# Patient Record
Sex: MALE | Race: WHITE | NOT HISPANIC OR LATINO | Employment: UNEMPLOYED | URBAN - METROPOLITAN AREA
[De-identification: names, ages, dates, MRNs, and addresses within clinical notes are randomized per-mention and may not be internally consistent; named-entity substitution may affect disease eponyms.]

---

## 2024-11-13 ENCOUNTER — HOSPITAL ENCOUNTER (OUTPATIENT)
Dept: CARDIOLOGY | Facility: HOSPITAL | Age: 59
Discharge: HOME | End: 2024-11-13
Admitting: INTERNAL MEDICINE
Payer: COMMERCIAL

## 2024-11-13 VITALS — WEIGHT: 231.48 LBS | BODY MASS INDEX: 31.35 KG/M2 | HEIGHT: 72 IN

## 2024-11-13 DIAGNOSIS — I34.0 NONRHEUMATIC MITRAL VALVE REGURGITATION: ICD-10-CM

## 2024-11-13 PROCEDURE — 93306 TTE W/DOPPLER COMPLETE: CPT

## 2024-11-14 LAB
ASCENDING AORTA: 3.9 CM
BH CV ECHO MEAS - AO MAX PG: 7.2 MMHG
BH CV ECHO MEAS - AO MEAN PG: 4 MMHG
BH CV ECHO MEAS - AO ROOT DIAM: 3.9 CM
BH CV ECHO MEAS - AO V2 MAX: 134 CM/SEC
BH CV ECHO MEAS - AO V2 VTI: 33.1 CM
BH CV ECHO MEAS - AVA(I,D): 3.5 CM2
BH CV ECHO MEAS - EDV(CUBED): 262.1 ML
BH CV ECHO MEAS - EDV(MOD-SP2): 113 ML
BH CV ECHO MEAS - EDV(MOD-SP4): 132 ML
BH CV ECHO MEAS - EF(MOD-BP): 57.1 %
BH CV ECHO MEAS - EF(MOD-SP2): 55.8 %
BH CV ECHO MEAS - EF(MOD-SP4): 58.3 %
BH CV ECHO MEAS - ESV(CUBED): 37.9 ML
BH CV ECHO MEAS - ESV(MOD-SP2): 49.9 ML
BH CV ECHO MEAS - ESV(MOD-SP4): 55.1 ML
BH CV ECHO MEAS - FS: 47.5 %
BH CV ECHO MEAS - IVS/LVPW: 1 CM
BH CV ECHO MEAS - IVSD: 1.1 CM
BH CV ECHO MEAS - LA DIMENSION: 4.8 CM
BH CV ECHO MEAS - LAT PEAK E' VEL: 6.8 CM/SEC
BH CV ECHO MEAS - LV DIASTOLIC VOL/BSA (35-75): 58.3 CM2
BH CV ECHO MEAS - LV MASS(C)D: 311.7 GRAMS
BH CV ECHO MEAS - LV MAX PG: 7 MMHG
BH CV ECHO MEAS - LV MEAN PG: 4 MMHG
BH CV ECHO MEAS - LV SYSTOLIC VOL/BSA (12-30): 24.3 CM2
BH CV ECHO MEAS - LV V1 MAX: 131 CM/SEC
BH CV ECHO MEAS - LV V1 VTI: 30.4 CM
BH CV ECHO MEAS - LVIDD: 6.4 CM
BH CV ECHO MEAS - LVIDS: 3.4 CM
BH CV ECHO MEAS - LVOT AREA: 3.8 CM2
BH CV ECHO MEAS - LVOT DIAM: 2.21 CM
BH CV ECHO MEAS - LVPWD: 1.1 CM
BH CV ECHO MEAS - MED PEAK E' VEL: 6.8 CM/SEC
BH CV ECHO MEAS - MV A MAX VEL: 67.9 CM/SEC
BH CV ECHO MEAS - MV DEC SLOPE: 243.6 CM/SEC2
BH CV ECHO MEAS - MV DEC TIME: 0.27 SEC
BH CV ECHO MEAS - MV E MAX VEL: 61.1 CM/SEC
BH CV ECHO MEAS - MV E/A: 0.9
BH CV ECHO MEAS - MV MAX PG: 3.3 MMHG
BH CV ECHO MEAS - MV MEAN PG: 0.96 MMHG
BH CV ECHO MEAS - MV P1/2T: 84.6 MSEC
BH CV ECHO MEAS - MV V2 VTI: 31.2 CM
BH CV ECHO MEAS - MVA(P1/2T): 2.6 CM2
BH CV ECHO MEAS - MVA(VTI): 3.7 CM2
BH CV ECHO MEAS - PA ACC TIME: 0.17 SEC
BH CV ECHO MEAS - RAP SYSTOLE: 3 MMHG
BH CV ECHO MEAS - RVSP: 18 MMHG
BH CV ECHO MEAS - SV(LVOT): 116.6 ML
BH CV ECHO MEAS - SV(MOD-SP2): 63.1 ML
BH CV ECHO MEAS - SV(MOD-SP4): 76.9 ML
BH CV ECHO MEAS - SVI(LVOT): 51.5 ML/M2
BH CV ECHO MEAS - SVI(MOD-SP2): 27.9 ML/M2
BH CV ECHO MEAS - SVI(MOD-SP4): 34 ML/M2
BH CV ECHO MEAS - TAPSE (>1.6): 2.19 CM
BH CV ECHO MEAS - TR MAX PG: 15.4 MMHG
BH CV ECHO MEAS - TR MAX VEL: 196.1 CM/SEC
BH CV ECHO MEASUREMENTS AVERAGE E/E' RATIO: 8.99
BH CV VAS BP RIGHT ARM: NORMAL MMHG
BH CV XLRA - RV BASE: 3.6 CM
BH CV XLRA - RV LENGTH: 8.2 CM
BH CV XLRA - RV MID: 2.19 CM
BH CV XLRA - TDI S': 14 CM/SEC
LEFT ATRIUM VOLUME INDEX: 30.3 ML/M2

## 2024-11-15 ENCOUNTER — TELEPHONE (OUTPATIENT)
Dept: CARDIOLOGY | Facility: CLINIC | Age: 59
End: 2024-11-15
Payer: COMMERCIAL

## 2024-11-15 NOTE — TELEPHONE ENCOUNTER
Discussed with pt recent Echo results, unchanged from prior.    Pt called and informed of the above results, verbalized understanding.

## 2025-01-15 ENCOUNTER — READMISSION MANAGEMENT (OUTPATIENT)
Dept: CALL CENTER | Facility: HOSPITAL | Age: 60
End: 2025-01-15
Payer: COMMERCIAL

## 2025-01-15 NOTE — OUTREACH NOTE
Prep Survey      Flowsheet Row Responses   [facility] facility patient discharged from? Non-BH   Is LACE score < 7 ? Non-BH Discharge   Eligibility [facility]   Date of Admission 01/02/25   Date of Discharge 01/15/25   Discharge diagnosis Dissection of ascending aorta (CMS/HCC) (Primary Dx),  Aortic arch dissection (CMS/HCC),  Aortic dissection, abdominal (CMS/HCC),  Acute hypoxic respiratory failure (CMS/HCC),  Cardiac volume overload,  Dissection of thoracoabdominal aorta (CMS/HCC)   Does the patient have one of the following disease processes/diagnoses(primary or secondary)? Other   Prep survey completed? Yes            Rosamaria GONZALEZ - Registered Nurse

## 2025-01-16 ENCOUNTER — TELEPHONE (OUTPATIENT)
Dept: CARDIOLOGY | Facility: CLINIC | Age: 60
End: 2025-01-16
Payer: COMMERCIAL

## 2025-01-16 ENCOUNTER — TRANSITIONAL CARE MANAGEMENT TELEPHONE ENCOUNTER (OUTPATIENT)
Dept: CALL CENTER | Facility: HOSPITAL | Age: 60
End: 2025-01-16
Payer: COMMERCIAL

## 2025-01-16 NOTE — TELEPHONE ENCOUNTER
Caller: Ranulfo Morales    Relationship: Self    Best call back number: [phone]     What is the best time to reach you: ANY    Who are you requesting to speak with (clinical staff, provider,  specific staff member): DR MICHELLE      What was the call regarding: PATIENT CALLED TO ADVISE THAT HE HAD EMERGENCY SURGERY AT [facility] ON HIS AORTA FOR AN 8 INCH TEAR ON 01-02-25. PATIENT IS REQUESTING A CALL BACK FROM DR. MICHELLE.     Is it okay if the provider responds through [facility]: PLEASE CALL

## 2025-01-16 NOTE — OUTREACH NOTE
Call Center TCM Note      Flowsheet Row Responses   [facility] patient discharged from? Non-  [[place]]   Does the patient have one of the following disease processes/diagnoses(primary or secondary)? Other   TCM attempt successful? Yes   Call start time 1110   Call end time 1133   Discharge diagnosis Dissection of ascending aorta (CMS/HCC) (Primary Dx),  Aortic arch dissection (CMS/HCC),  Aortic dissection, abdominal (CMS/HCC),  Acute hypoxic respiratory failure (CMS/HCC),  Cardiac volume overload,  Dissection of thoracoabdominal aorta (CMS/HCC)   Is patient permission given to speak with other caregiver? Yes   Person spoke with today (if not patient) and relationship wife, Capri Hansen reviewed with patient/caregiver? Yes   Does the patient have all medications ordered at discharge? Yes   Is the patient taking all medications as directed (includes completed medication regime)? Yes   Comments PCP Dr Lemos. Hospital follow up appt scheduled for 1/23  1245pm with PCP   Does the patient have an appointment with their PCP within 7-14 days of discharge? No   Nursing Interventions Assisted patient with making appointment per protocol, Routed TCM call to PCP office   Has home health visited the patient within 72 hours of discharge? N/A   Psychosocial issues? No   Comments Wife keeping log of B/P, HR, O2 sat. Discussed daily weight monitoring as well.   Did the patient receive a copy of their discharge instructions? Yes   Nursing interventions Reviewed instructions with patient  [wife]   What is the patient's perception of their health status since discharge? Same  [Wife reports that patient has been having some orthostatic hypotension issues in hospital and at home.]   Is the patient/caregiver able to teach back signs and symptoms related to disease process for when to call PCP? Yes   Is the patient/caregiver able to teach back signs and symptoms related to disease process for when to call 911? Yes   Is the  patient/caregiver able to teach back the hierarchy of who to call/visit for symptoms/problems? PCP, Specialist, Home health nurse, Urgent Care, ED, 911 Yes   If the patient is a current smoker, are they able to teach back resources for cessation? Not a smoker   TCM call completed? Yes   Call end time 1133   Would this patient benefit from a Referral to [facility] Social Work? No   Is the patient interested in additional calls from an ambulatory ? No            Daniela Woods RN    1/16/2025, 11:38 EST

## 2025-01-16 NOTE — TELEPHONE ENCOUNTER
Dr Maldonado personally called and spoke with patient regarding recent events , will follow up accordingly .

## 2025-01-22 ENCOUNTER — TELEPHONE (OUTPATIENT)
Dept: INTERNAL MEDICINE | Facility: CLINIC | Age: 60
End: 2025-01-22

## 2025-01-22 NOTE — TELEPHONE ENCOUNTER
Caller: Ranulfo Morales    Relationship: Self    Best call back number: [phone]         What was the call regarding: PATIENT WANTS DR MARTINEZ TO KNOW HE IS BACK IN  BUT HE HAD A Dissection of thoracoabdominal aorta ON 1/2/25 AND HE WAS READMITTED ON 1/21/25    Is it okay if the provider responds through MyChart:

## 2025-01-24 ENCOUNTER — READMISSION MANAGEMENT (OUTPATIENT)
Dept: CALL CENTER | Facility: HOSPITAL | Age: 60
End: 2025-01-24
Payer: COMMERCIAL

## 2025-01-25 NOTE — OUTREACH NOTE
Prep Survey      Flowsheet Row Responses   [facility] facility patient discharged from? Non-BH   Is LACE score < 7 ? Non-BH Discharge   Eligibility [facility]   Date of Admission 01/22/25   Date of Discharge 01/24/25   Discharge diagnosis Aortic arch dissection   Does the patient have one of the following disease processes/diagnoses(primary or secondary)? Other   Prep survey completed? Yes            Rosamaria GONZALEZ - Registered Nurse

## 2025-01-27 ENCOUNTER — TRANSITIONAL CARE MANAGEMENT TELEPHONE ENCOUNTER (OUTPATIENT)
Dept: CALL CENTER | Facility: HOSPITAL | Age: 60
End: 2025-01-27
Payer: COMMERCIAL

## 2025-01-27 NOTE — OUTREACH NOTE
Call Center TCM Note      Flowsheet Row Responses   [facility] patient discharged from? Non-  []   Does the patient have one of the following disease processes/diagnoses(primary or secondary)? Other   TCM attempt successful? Yes   Call start time 1516   Call Status Left message   Call end time 1531   Discharge diagnosis Aortic arch dissection / repair 1/2/25,  readmitted for  DVT, pulmonary embolism   Person spoke with today (if not patient) and relationship Patient   Meds reviewed with patient/caregiver? Yes   Does the patient have all medications ordered at discharge? Yes   Is the patient taking all medications as directed (includes completed medication regime)? Yes   Comments PCP Dr Lemos. Patient declines to schedule PCP appt with call today. He reports that he has specialists follow up in place.   Does the patient have an appointment with their PCP within 7-14 days of discharge? No   Nursing Interventions Patient desires to follow up with specialty only, Routed TCM call to PCP office   Has home health visited the patient within 72 hours of discharge? N/A   Psychosocial issues? No   Comments Wife keeping log of B/P, HR, O2 sat. Discussed daily weight monitoring as well.   Did the patient receive a copy of their discharge instructions? Yes   Nursing interventions Reviewed instructions with patient   What is the patient's perception of their health status since discharge? Improving   Is the patient/caregiver able to teach back signs and symptoms related to disease process for when to call PCP? Yes   Is the patient/caregiver able to teach back signs and symptoms related to disease process for when to call 911? Yes   Is the patient/caregiver able to teach back the hierarchy of who to call/visit for symptoms/problems? PCP, Specialist, Home health nurse, Urgent Care, ED, 911 Yes   If the patient is a current smoker, are they able to teach back resources for cessation? Not a smoker   TCM call completed? Yes    Call end time 1531   Would this patient benefit from a Referral to [facility] Social Work? No   Is the patient interested in additional calls from an ambulatory ? No            Daniela Woods RN    1/27/2025, 15:35 EST

## 2025-01-27 NOTE — OUTREACH NOTE
Call Center TCM Note      Flowsheet Row Responses   [facility] facility patient discharged from? Non-  []   Does the patient have one of the following disease processes/diagnoses(primary or secondary)? Other   TCM attempt successful? No   Unsuccessful attempts Attempt 1  [Attempted to reach patient and wife, Capri.]            Daniela Woods RN    1/27/2025, 12:37 EST

## 2025-01-28 ENCOUNTER — TELEPHONE (OUTPATIENT)
Dept: INTERNAL MEDICINE | Facility: CLINIC | Age: 60
End: 2025-01-28
Payer: COMMERCIAL

## 2025-01-28 NOTE — TELEPHONE ENCOUNTER
Caller: Capri Morales    Relationship: Emergency Contact    Best call back number: [phone]     What was the call regarding: PATIENTS WIFE IS CALLING AND STATES THE PATIENT HAS RECENTLY HAD SURGERY DONE AND IS HAVING A HARD TIME WALKING. SHE WOULD LIKE TO KNOW IF THEY ARE ABLE TO GET A TEMPORARY HANDICAP TAG TO HELP PARK CLOSE.

## 2025-01-29 NOTE — TELEPHONE ENCOUNTER
Called and left a message for the pt's spouse to call us back and schedule a hosp f/u appointment.

## 2025-01-29 NOTE — TELEPHONE ENCOUNTER
Pt did not want to schedule an appointment until he saw other specialist first. He scheduled for 2/27/25.

## 2025-01-29 NOTE — TELEPHONE ENCOUNTER
Suggest making TCM follow up in next 2 weeks and please fill out temporary for and I will sign for handicap

## 2025-02-07 ENCOUNTER — READMISSION MANAGEMENT (OUTPATIENT)
Dept: CALL CENTER | Facility: HOSPITAL | Age: 60
End: 2025-02-07
Payer: COMMERCIAL

## 2025-02-07 NOTE — OUTREACH NOTE
Prep Survey      Flowsheet Row Responses   [facility] facility patient discharged from? Non-BH   Is LACE score < 7 ? Non-BH Discharge   Eligibility Not Eligible   What are the reasons patient is not eligible? Other  [Post op visit to ]   Does the patient have one of the following disease processes/diagnoses(primary or secondary)? Other   Prep survey completed? Yes            Cady YU - Registered Nurse

## 2025-02-20 ENCOUNTER — READMISSION MANAGEMENT (OUTPATIENT)
Dept: CALL CENTER | Facility: HOSPITAL | Age: 60
End: 2025-02-20
Payer: COMMERCIAL

## 2025-02-20 NOTE — OUTREACH NOTE
Prep Survey      Flowsheet Row Responses   [facility] facility patient discharged from? Non-BH   Is LACE score < 7 ? Non-BH Discharge   Eligibility Not Eligible   What are the reasons patient is not eligible? Other  [radiology visit]   Does the patient have one of the following disease processes/diagnoses(primary or secondary)? Other   Prep survey completed? Yes            Lyubov Olson Registered Nurse

## 2025-03-07 ENCOUNTER — OFFICE VISIT (OUTPATIENT)
Dept: INTERNAL MEDICINE | Facility: CLINIC | Age: 60
End: 2025-03-07
Payer: COMMERCIAL

## 2025-03-07 VITALS
HEIGHT: 72 IN | WEIGHT: 230 LBS | BODY MASS INDEX: 31.15 KG/M2 | TEMPERATURE: 97.3 F | DIASTOLIC BLOOD PRESSURE: 66 MMHG | OXYGEN SATURATION: 98 % | HEART RATE: 64 BPM | SYSTOLIC BLOOD PRESSURE: 114 MMHG

## 2025-03-07 DIAGNOSIS — I71.010 DISSECTION OF ASCENDING AORTA: ICD-10-CM

## 2025-03-07 DIAGNOSIS — I82.431 ACUTE DEEP VEIN THROMBOSIS (DVT) OF POPLITEAL VEIN OF RIGHT LOWER EXTREMITY: ICD-10-CM

## 2025-03-07 DIAGNOSIS — I10 BENIGN ESSENTIAL HYPERTENSION: Primary | ICD-10-CM

## 2025-03-07 DIAGNOSIS — K21.9 GASTROESOPHAGEAL REFLUX DISEASE, UNSPECIFIED WHETHER ESOPHAGITIS PRESENT: ICD-10-CM

## 2025-03-07 DIAGNOSIS — R73.01 IMPAIRED FASTING GLUCOSE: ICD-10-CM

## 2025-03-07 RX ORDER — METOPROLOL TARTRATE 100 MG/1
100 TABLET ORAL 2 TIMES DAILY
Start: 2025-03-07

## 2025-03-07 NOTE — PROGRESS NOTES
"Chief Complaint   Patient presents with    [facility] f/u    Heartburn       History of Present Illness  59 y.o. male presents for follow up of extensive cardiac surgery at .     Review of Systems   Constitutional:  Negative for chills and fever.   Respiratory:  Negative for cough and shortness of breath.    Cardiovascular:  Positive for leg swelling. Negative for chest pain and palpitations.   Gastrointestinal:  Negative for abdominal pain, nausea and vomiting.   Musculoskeletal:  Positive for back pain and myalgias.   Skin:  Negative for rash.   Neurological:  Negative for dizziness, light-headedness and headaches.   Psychiatric/Behavioral:  Positive for decreased concentration. Negative for dysphoric mood and sleep disturbance. The patient is not nervous/anxious.    All other systems reviewed and are negative.    .    PMSFH:  The following portions of the patient's history were reviewed and updated as appropriate: allergies, current medications, past family history, past medical history, past social history, past surgical history and problem list.      Current Outpatient Medications:     multivitamin with minerals tablet tablet, Take 1 tablet by mouth Daily., Disp: , Rfl:     apixaban (ELIQUIS) 5 MG tablet tablet, Take 1 tablet by mouth 2 (Two) Times a Day., Disp: , Rfl:     metoprolol tartrate (LOPRESSOR) 100 MG tablet, Take 1 tablet by mouth 2 (Two) Times a Day., Disp: , Rfl:     VITALS:  /66 (BP Location: Left arm, Patient Position: Sitting, Cuff Size: Adult)   Pulse 64   Temp 97.3 °F (36.3 °C) (Infrared)   Ht 182.9 cm (72.01\")   Wt 104 kg (230 lb)   SpO2 98%   BMI 31.19 kg/m²     Physical Exam  Constitutional:       Appearance: Normal appearance.   Cardiovascular:      Rate and Rhythm: Normal rate and regular rhythm.   Pulmonary:      Effort: Pulmonary effort is normal.      Breath sounds: No wheezing or rales.   Abdominal:      General: Bowel sounds are normal.      Palpations: Abdomen is " soft.      Tenderness: There is no abdominal tenderness.   Skin:     Comments: Incisions sites look good.    Neurological:      General: No focal deficit present.      Mental Status: He is alert and oriented to person, place, and time.   Psychiatric:         Mood and Affect: Mood normal.         Behavior: Behavior normal.       Current outpatient and discharge medications have been reconciled for the patient.  Reviewed by: Kvng Lemos MD   LABS:    CMP:  Lab Results   Component Value Date    BUN 13 04/22/2022    CREATININE 0.96 04/22/2022    EGFRIFNONA 82 11/18/2020     01/07/2025    K 3.3 (L) 01/07/2025     (H) 01/07/2025    CALCIUM 9.7 04/22/2022    ALBUMIN 3.2 (L) 01/13/2025    BILITOT 0.6 01/06/2025    ALKPHOS 31 (L) 01/06/2025    AST 60 (H) 01/06/2025    ALT 49 01/06/2025     CBC:  Lab Results   Component Value Date    WBC 6.74 02/06/2025    RBC 4.03 (L) 02/06/2025    HGB 11.3 (L) 02/06/2025    HCT 36.8 (L) 02/06/2025    MCV 91 02/06/2025    MCH 28 02/06/2025    MCHC 30.7 02/06/2025    RDW 13.4 02/06/2025     02/06/2025     LIPID PANEL:  Lab Results   Component Value Date    TRIG 83 04/22/2022    HDL 60 04/22/2022    VLDL 15 04/22/2022    LDL 93 04/22/2022    LDLHDL 1.52 04/22/2022     TSH:  Lab Results   Component Value Date    TSH 1.240 04/22/2022     HGBA1C (LAST 3):  Lab Results   Component Value Date    HGBA1C 5.70 (H) 04/22/2022    HGBA1C 5.87 (H) 11/18/2020     MICROALBUMIN SPOT URINE:  Lab Results   Component Value Date    MICROALBUR <1.2 04/22/2022     Procedures         ASSESSMENT/PLAN:  Diagnoses and all orders for this visit:    1. Benign essential hypertension (Primary)  Assessment & Plan:  Hypertension is stable and controlled  Continue current treatment regimen.  Dietary sodium restriction.  Weight loss.  Regular aerobic exercise.  Blood pressure will be reassessed in 6 months.    Orders:  -     metoprolol tartrate (LOPRESSOR) 100 MG tablet; Take 1 tablet by mouth 2 (Two)  Times a Day.    2. Acute deep vein thrombosis (DVT) of popliteal vein of right lower extremity  Assessment & Plan:  Doing ok with eliquis     Orders:  -     apixaban (ELIQUIS) 5 MG tablet tablet; Take 1 tablet by mouth 2 (Two) Times a Day.    3. Impaired fasting glucose  Assessment & Plan:  Discussed decreasing bad carbohydrates, specifically sweets, breads, potatoes, corn and high caloric drinks (juices, sodas, sweet tea).  Also recommend increasing physical activity, ideally 150 minutes aerobic exercise weekly and resistance exercises 2-3x/week.       4. Gastroesophageal reflux disease, unspecified whether esophagitis present  Assessment & Plan:  Doing ok and will use as needed pepcid complete 1 time or less a week.       5. Dissection of ascending aorta  Assessment & Plan:  Getting procedure 3/13/2025          FOLLOW-UP:  Return in about 5 months (around 7/23/2025) for Annual physical.      Electronically signed by:    Kvng Lemos MD

## 2025-03-14 ENCOUNTER — READMISSION MANAGEMENT (OUTPATIENT)
Dept: CALL CENTER | Facility: HOSPITAL | Age: 60
End: 2025-03-14
Payer: COMMERCIAL

## 2025-03-15 ENCOUNTER — NURSE TRIAGE (OUTPATIENT)
Dept: CALL CENTER | Facility: HOSPITAL | Age: 60
End: 2025-03-15
Payer: COMMERCIAL

## 2025-03-15 ENCOUNTER — TRANSITIONAL CARE MANAGEMENT TELEPHONE ENCOUNTER (OUTPATIENT)
Dept: CALL CENTER | Facility: HOSPITAL | Age: 60
End: 2025-03-15
Payer: COMMERCIAL

## 2025-03-15 NOTE — TELEPHONE ENCOUNTER
"See prev encounter as this one was opened accidnetly  Answer Assessment - Initial Assessment Questions  1. REASON FOR CALL or QUESTION: \"What is your reason for calling today?\" or \"How can I best help you?\" or \"What question do you have that I can help answer?\"      See other encounter prior to this one regarding fever and other s/s of this patient; accidentally opened this new encounter.    Protocols used: Information Only Call - No Triage-ADULT-    "

## 2025-03-15 NOTE — TELEPHONE ENCOUNTER
Reason for Disposition  • [1] Fever > 100.0 F (37.8 C) AND [2] surgery in the last month    Additional Information  • Negative: Shock suspected (e.g., cold/pale/clammy skin, too weak to stand, low BP, rapid pulse)  • Negative: Difficult to awaken or acting confused (e.g., disoriented, slurred speech)  • Negative: [1] Difficulty breathing AND [2] bluish lips, tongue or face  • Negative: New-onset rash with multiple purple (or blood-colored) spots or dots  • Negative: Sounds like a life-threatening emergency to the triager  • Negative: Fever in a cancer patient who is currently (or recently) receiving chemotherapy or radiation therapy, or cancer patient who has metastatic or end-stage cancer and is receiving palliative care  • Negative: Pregnant  • Negative: Postpartum (from 0 to 6 weeks after delivery)  • Negative: Fever onset within 24 hours of receiving vaccine  • Negative: [1] Fever AND [2] within 14 days of COVID-19 Exposure  • Negative: Other symptom is present, see that guideline (e.g., symptoms of cough, runny nose, sore throat, earache, abdominal pain, diarrhea, vomiting)  • Negative: [1] Headache AND [2] stiff neck (can't touch chin to chest)  • Negative: Difficulty breathing  • Negative: IV Drug Use (IVDU)  • Negative: [1] Drinking very little AND [2] dehydration suspected (e.g., no urine > 12 hours, very dry mouth, very lightheaded)  • Negative: Widespread rash and cause unknown  • Negative: Patient sounds very sick or weak to the triager  (Exception: Mild weakness and hasn't taken fever medicine.)  • Negative: Fever > 104 F (40 C)  • Negative: [1] Fever > 101 F (38.3 C) AND [2] age > 60 years  • Negative: [1] Fever > 100.0 F (37.8 C) AND [2] bedridden (e.g., CVA, chronic illness, recovering from surgery)  • Negative: [1] Fever > 100.0 F (37.8 C) AND [2] indwelling urinary catheter (e.g., Rucker, Coude)  • Negative: [1] Fever > 100.0 F (37.8 C) AND [2] has port (portacath), central line, or PICC line  •  "Negative: [1] Fever > 100.0 F (37.8 C) AND [2] diabetes mellitus or weak immune system (e.g., HIV positive, cancer chemo, splenectomy, organ transplant, chronic steroids)    Answer Assessment - Initial Assessment Questions  1. TEMPERATURE: \"What is the most recent temperature?\"  \"How was it measured?\"       102.0  2. ONSET: \"When did the fever start?\"       Today 102.0 and yesterday 99.2  3. CHILLS: \"Do you have chills?\" If yes: \"How bad are they?\"  (e.g., none, mild, moderate, severe)    - NONE: no chills    - MILD: feeling cold    - MODERATE: feeling very cold, some shivering (feels better under a thick blanket)    - SEVERE: feeling extremely cold with shaking chills (general body shaking, rigors; even under a thick blanket)       Moderate   4. OTHER SYMPTOMS: \"Do you have any other symptoms besides the fever?\"  (e.g., abdomen pain, cough, diarrhea, earache, headache, sore throat, urination pain)      Cough, congestion, headache when he woke up today, sore throat and frequent urination   5. CAUSE: If there are no symptoms, ask: \"What do you think is causing the fever?\"       Not sure   6. CONTACTS: \"Does anyone else in the family have an infection?\"      no  7. TREATMENT: \"What have you done so far to treat this fever?\" (e.g., medications)      Ibuprofen/bp meds and blood thinner   8. IMMUNOCOMPROMISE: \"Do you have of the following: diabetes, HIV positive, splenectomy, cancer chemotherapy, chronic steroid treatment, transplant patient, etc.\"      Recent surgery   9. PREGNANCY: \"Is there any chance you are pregnant?\" \"When was your last menstrual period?\"      nA  10. TRAVEL: \"Have you traveled out of the country in the last month?\" (e.g., travel history, exposures)        No    Protocols used: Fever-ADULT-AH    "

## 2025-03-15 NOTE — OUTREACH NOTE
Prep Survey      Flowsheet Row Responses   [facility] facility patient discharged from? Non-BH   Is LACE score < 7 ? Non- Discharge   Eligibility [facility]   Date of Admission 03/13/25   Date of Discharge 03/14/25   Discharge diagnosis Acute deep vein thrombosis   Does the patient have one of the following disease processes/diagnoses(primary or secondary)? Other   Does the patient have Home health ordered? No   Is there a DME ordered? No   Prep survey completed? Yes            JAMEE MOODY - Registered Nurse

## 2025-03-15 NOTE — TELEPHONE ENCOUNTER
Pts wife called with concerns of her  not feeling well after being discharged yesterday from the hospital.  Pt Woke up with a bad headache, fever 102.0, urinating frequently and not very much at a time; congested cough today worse than yesterday per wife.Sleeping most of the day today per wife.  I could hear  in the background, he didn't sound like he felt well.  He also has not had a bm since surgery either but no abdominal pain.  With having fever and recent surgery, he needs to call PCP or specialist which would be Dr Whitaker who did his vascular surgery in this case and let them know what is going on.  Care advice given.  Transferred pts wife via phone to Dr Whitaker's on call answering service at this time.  Will call back with any other questions or concerns.

## 2025-03-15 NOTE — OUTREACH NOTE
Call Center TCM Note      Flowsheet Row Responses   [facility] patient discharged from? Non-   Does the patient have one of the following disease processes/diagnoses(primary or secondary)? Other   TCM attempt successful? Yes   Call start time 1116   Call end time 1123   Discharge diagnosis Acute deep vein thrombosis   Person spoke with today (if not patient) and relationship Capri(Spouse)   Meds reviewed with patient/caregiver? Yes   Is the patient having any side effects they believe may be caused by any medication additions or changes? No   Does the patient have all medications ordered at discharge? Yes   Is the patient taking all medications as directed (includes completed medication regime)? Yes   Comments Scheduled hospital f/u appt. with PCP 3/21/25 @ 12:45pm.   Does the patient have an appointment with their PCP within 7-14 days of discharge? Yes   Has home health visited the patient within 72 hours of discharge? N/A   Psychosocial issues? No   Did the patient receive a copy of their discharge instructions? Yes   Nursing interventions Reviewed instructions with patient   What is the patient's perception of their health status since discharge? Improving   Is the patient/caregiver able to teach back signs and symptoms related to disease process for when to call PCP? Yes   Is the patient/caregiver able to teach back signs and symptoms related to disease process for when to call 911? Yes   Is the patient/caregiver able to teach back the hierarchy of who to call/visit for symptoms/problems? PCP, Specialist, Home health nurse, Urgent Care, ED, 911 Yes   If the patient is a current smoker, are they able to teach back resources for cessation? Not a smoker   TCM call completed? Yes   Call end time 1123   Would this patient benefit from a Referral to Amb Social Work? No   Is the patient interested in additional calls from an ambulatory ? No            Nettie Jackson, RN    3/15/2025, 11:25 EDT

## 2025-03-21 ENCOUNTER — OFFICE VISIT (OUTPATIENT)
Dept: INTERNAL MEDICINE | Facility: CLINIC | Age: 60
End: 2025-03-21
Payer: COMMERCIAL

## 2025-03-21 VITALS
WEIGHT: 225.4 LBS | DIASTOLIC BLOOD PRESSURE: 70 MMHG | SYSTOLIC BLOOD PRESSURE: 118 MMHG | OXYGEN SATURATION: 98 % | TEMPERATURE: 97.1 F | HEART RATE: 68 BPM | HEIGHT: 72 IN | BODY MASS INDEX: 30.53 KG/M2

## 2025-03-21 DIAGNOSIS — I82.431 ACUTE DEEP VEIN THROMBOSIS (DVT) OF POPLITEAL VEIN OF RIGHT LOWER EXTREMITY: ICD-10-CM

## 2025-03-21 DIAGNOSIS — I10 BENIGN ESSENTIAL HYPERTENSION: ICD-10-CM

## 2025-03-21 DIAGNOSIS — I49.9 IRREGULAR HEART BEATS: ICD-10-CM

## 2025-03-21 DIAGNOSIS — I71.010 DISSECTION OF ASCENDING AORTA: ICD-10-CM

## 2025-03-21 DIAGNOSIS — R06.09 DYSPNEA ON EXERTION: ICD-10-CM

## 2025-03-21 DIAGNOSIS — I82.431 ACUTE DEEP VEIN THROMBOSIS (DVT) OF POPLITEAL VEIN OF RIGHT LOWER EXTREMITY: Primary | ICD-10-CM

## 2025-03-21 RX ORDER — VALSARTAN 40 MG/1
TABLET ORAL
Qty: 30 TABLET | Refills: 2 | Status: SHIPPED | OUTPATIENT
Start: 2025-03-21 | End: 2025-03-21 | Stop reason: SDUPTHER

## 2025-03-21 RX ORDER — METOPROLOL TARTRATE 25 MG/1
TABLET, FILM COATED ORAL
Qty: 60 TABLET | Refills: 2 | Status: SHIPPED | OUTPATIENT
Start: 2025-03-21 | End: 2025-03-21

## 2025-03-21 RX ORDER — VALSARTAN 40 MG/1
TABLET ORAL
Qty: 30 TABLET | Refills: 2 | Status: SHIPPED | OUTPATIENT
Start: 2025-03-21

## 2025-03-21 NOTE — ASSESSMENT & PLAN NOTE
Acute issue with low BP and dizzy with nifedipine and metoprolol and diovan. Change to low dose (diovan) valsartan 40 mg if systolic > 165.

## 2025-03-21 NOTE — PROGRESS NOTES
Chief Complaint   Patient presents with    Hypertension    hospital f/u    Heartburn       History of Present Illness  59 y.o. male presents for follow up from  hospital with relevant PMH PE, aortic dissection, who presented to the ED on 3/17/2025 for evaluation of post surgical to evolve or acute dissection recent surgery.  With dissection revision on 3/13/2025.   Febrile at home feeling bad no pain. Weakness, shortness of breath     DVT/PE on Eliquis, HTN, HOCM, median sternotomy and hemiarch replacement with Dr. Patel 01/2025, Zone 3 TEVAR with Dr. Whitaker on 1/2/2025 for acute TAAD and recently status post endovascular repair of residual type B dissection, balloon angioplasty and stenting of left subclavian artery on 03/13 who presented to the  Healthcare on 3/17/2025 with fever. He was in ER off and on over weekend and negative for covid, flu and numerous other viruses. He was having low blood pressures until he started holding his blood pressures. He has been struggling with increased shortness of air with mild exertion. The scan appeared to have been prior ascending aortic repair. Redemonstration of an adjacent nonenhancing fluid collection which is similar in size to the prior examination measuring approximately 3.5 x 2.8 cm with peripheral rind. Unchanged wall thickening of the ascending aorta which may be related to postsurgical changes and/or intramural hematoma with unchanged approximately 14 mm outpouching from the superior right ascending aorta series 9 image 145. There has been interval placement of an intra-aortic stent which extends from the level of the prior ascending aortic repair to the level of the previously seen distal aortic arch stent. Stents remain patent. There is now noted to be increasing thrombus within the majority of the dissection within the aortic arch and the descending aorta with a small amount of residual contrast within the distal most ascending aorta adjacent to the distal  "portion of the stent which may be related to retrograde filling.     There is new fat stranding, questionable small hematoma, and multiple foci of gas within the right inguinal region which is nonspecific and may be related to recent instrumenta     Review of Systems   Constitutional:  Positive for fatigue and fever (fever better).   Respiratory:  Positive for shortness of breath (shortness of breath with exertion).    Cardiovascular:  Negative for chest pain and palpitations.   Neurological:  Positive for dizziness (on BP meds but better now) and weakness.   Hematological:  Negative for adenopathy.   Psychiatric/Behavioral:  Negative for dysphoric mood.      .    PMSFH:  The following portions of the patient's history were reviewed and updated as appropriate: allergies, current medications, past family history, past medical history, past social history, past surgical history and problem list.      Current Outpatient Medications:     apixaban (ELIQUIS) 5 MG tablet tablet, Take 1 tablet by mouth 2 (Two) Times a Day., Disp: , Rfl:     multivitamin with minerals tablet tablet, Take 1 tablet by mouth Daily., Disp: , Rfl:     valsartan (Diovan) 40 MG tablet, 1 tab daily if systolic remains > 165 ., Disp: 30 tablet, Rfl: 2  Current outpatient and discharge medications have been reconciled for the patient.  Reviewed by: Kvng Lemos MD   VITALS:  /70 (BP Location: Left arm, Patient Position: Sitting, Cuff Size: Adult)   Pulse 68   Temp 97.1 °F (36.2 °C) (Infrared)   Ht 182.9 cm (72.01\")   Wt 102 kg (225 lb 6.4 oz)   SpO2 98%   BMI 30.56 kg/m²     Physical Exam  Constitutional:       Appearance: Normal appearance.   Cardiovascular:      Rate and Rhythm: Normal rate. Rhythm irregular.   Pulmonary:      Effort: Pulmonary effort is normal.      Breath sounds: No wheezing or rales.   Abdominal:      General: Bowel sounds are normal.      Palpations: Abdomen is soft.      Tenderness: There is no abdominal " tenderness.   Neurological:      General: No focal deficit present.      Mental Status: He is alert and oriented to person, place, and time.   Psychiatric:         Mood and Affect: Mood normal.         Behavior: Behavior normal.         LABS:    CMP:  Lab Results   Component Value Date    BUN 13 04/22/2022    CREATININE 0.96 04/22/2022    EGFRIFNONA 82 11/18/2020     01/07/2025    K 4 03/17/2025     03/17/2025    CALCIUM 9.7 04/22/2022    ALBUMIN 3.2 (L) 01/13/2025    BILITOT 0.6 01/06/2025    ALKPHOS 31 (L) 01/06/2025    AST 60 (H) 01/06/2025    ALT 49 01/06/2025     CBC:  Lab Results   Component Value Date    WBC 5.48 03/17/2025    RBC 5.23 03/17/2025    HGB 14.2 03/17/2025    HCT 42.9 03/17/2025    MCV 82 03/17/2025    MCH 27.2 03/17/2025    MCHC 33.1 03/17/2025    RDW 13.7 03/17/2025     03/17/2025     LIPID PANEL:  Lab Results   Component Value Date    TRIG 83 04/22/2022    HDL 60 04/22/2022    VLDL 15 04/22/2022    LDL 93 04/22/2022    LDLHDL 1.52 04/22/2022     TSH:  Lab Results   Component Value Date    TSH 1.240 04/22/2022     HGBA1C (LAST 3):  Lab Results   Component Value Date    HGBA1C 5.70 (H) 04/22/2022    HGBA1C 5.87 (H) 11/18/2020     MICROALBUMIN SPOT URINE:  Lab Results   Component Value Date    MICROALBUR <1.2 04/22/2022       ECG 12 Lead    Date/Time: 3/21/2025 3:48 PM  Performed by: Kvng Lemos MD    Authorized by: Kvng Lemos MD  Comparison: compared with previous ECG from 4/21/2022  Comparison to previous ECG: Improved   Rhythm: sinus rhythm  Rate: normal  Comments: Moderate intraventricular conduction                ASSESSMENT/PLAN:  Diagnoses and all orders for this visit:    1. Acute deep vein thrombosis (DVT) of popliteal vein of right lower extremity (Primary)  Assessment & Plan:  Follow with Dr Burk and Dr Maldonado and likely keep on eliquis at least until mid July 2025       2. Dissection of ascending aorta  Assessment & Plan:  Follow with Dr Burk on  3/26/2025       3. Benign essential hypertension  Assessment & Plan:  Acute issue with low BP and dizzy with nifedipine and metoprolol and diovan. Change to low dose (diovan) valsartan 40 mg if systolic > 165.     Orders:  -     Discontinue: valsartan (Diovan) 40 MG tablet; 1 tab daily if systolic remains > 165 after taking metoprolol  Dispense: 30 tablet; Refill: 2  -     Discontinue: metoprolol tartrate (LOPRESSOR) 25 MG tablet; One tab every every 12 hours if systolic > 165 or diastolic > 95  Dispense: 60 tablet; Refill: 2  -     Ambulatory Referral to Cardiology  -     valsartan (Diovan) 40 MG tablet; 1 tab daily if systolic remains > 165 .  Dispense: 30 tablet; Refill: 2    4. Dyspnea on exertion  Assessment & Plan:  59 y.o. male presents for follow up from  hospital with relevant PMH PE, aortic dissection, who presented to the ED on 3/17/2025 for evaluation of post surgical to evolve or acute dissection recent surgery.  With dissection revision on 3/13/2025.   Febrile at home feeling bad no pain. Weakness, shortness of breath     DVT/PE on Eliquis, HTN, HOCM, median sternotomy and hemiarch replacement with Dr. Patel 01/2025, Zone 3 TEVAR with Dr. Whitaker on 1/2/2025 for acute TAAD and recently status post endovascular repair of residual type B dissection, balloon angioplasty and stenting of left subclavian artery on 03/13 who presented to the [facility] on 3/17/2025 with fever. He was in ER off and on over weekend and negative for covid, flu and numerous other viruses. He was having low blood pressures until he started holding his blood pressures. He has been struggling with increased shortness of air with mild exertion. The scan appeared to have been prior ascending aortic repair. Redemonstration of an adjacent nonenhancing fluid collection which is similar in size to the prior examination measuring approximately 3.5 x 2.8 cm with peripheral rind. Unchanged wall thickening of the ascending aorta which may  be related to postsurgical changes and/or intramural hematoma with unchanged approximately 14 mm outpouching from the superior right ascending aorta series 9 image 145. There has been interval placement of an intra-aortic stent which extends from the level of the prior ascending aortic repair to the level of the previously seen distal aortic arch stent. Stents remain patent. There is now noted to be increasing thrombus within the majority of the dissection within the aortic arch and the descending aorta with a small amount of residual contrast within the distal most ascending aorta adjacent to the distal portion of the stent which may be related to retrograde filling.    Vascular Surgery Discharge Summary            Hospitalization  Admit Date/Time: 3/13/2025  5:46 AM  Admitting Attending: Dale Whitaker  Discharge Date: 3/14/2025  Discharge Attending Physician: TIFFANY Goodson   PCP name and Address:  Kvng Lemos MD [address] / [facility] [address]  Referring provider name and address:   Birgit Can, APRN  [address]  [place] [address]     Chief Concern, Brief History of Present Illness, and Hospital Course  Ranulfo Morales is a 59 y.o. male who presented to [facility] on 3/13/2025 known residual type B dissection, Zone 3 CTAG.  Patient s/p median sternotomy and hemiarch replacement with Dr. Patel and Zone 3 TEVAR with Dr. Whitaker on 1/2/2025 for acute TAAD.  Patient was seen in office by Dr. Whitaker on 2/19.  A comprehensive discussion of nonoperative, best medical therapy, endovascular, open surgical and hybrid surgical options was had with the patient, along with risks.  Patient verbalized understanding and wished to proceed with surgical intervention.  Patient presented on the above mentioned day and proceeded to OR as planned with Dr. Whitaker for the following procedures:     Procedure(s) 3/13/2025 (Julianne):  Endovascular repair of thoracic aortic dissection with  thoracic branch endoprosthesis ([place] TBE 37 mm x 15 cm; LSCA 15 mm branch)  Angiography and selective catheterization of the left subclavian artery  Percutaneous transluminal balloon angioplasty and stenting of the left subclavian artery  Closure of large bore > 12 F (22 [place] DrySeal)     The patient was successfully extubated post-operatively without reintubation. On day of discharge (POD #1), patient was evaluated by the  Vascular Surgery team.  Vital signs and laboratory values were stable and appropriate.  Patient was voiding independently, ambulating, tolerating regular diet, reporting adequate pain control, and flatus. Hospital course was overall uncomplicated other than episode of bigeminy/trigeminy for which the patient was worked up.  Patient follows with Cardiology and reports being told in the past he had an irregular heartbeat. At the time of discharge, pt denies chest pain, palpitations, and SOB. He was walking around the unit just prior to evaluating him. His troponins were 12 to 1        I have gone over his hospitalization from  from 3/13 to 3/14/2025 and his medications and his follow up visits and adjusted his medications and follow up visits to add DR Maldonado and he voiced understanding. He will resume cardiology rehab next week and see Cardiology next week. His blood count was better on 3/17 and had low BNP regarding his breathing. He is highly complicated and already been back to ER but hopefully adjusting medicines and follow ups will reduce risk of more ER or hospitalizations.       5. Irregular heart beats  Comments:  asymptomatic and good EKG. Folllow with Cardiology next week    Other orders  -     ECG 12 Lead        FOLLOW-UP:  Return in about 4 months (around 7/21/2025).      Electronically signed by:    Kvng Lemos MD

## 2025-03-21 NOTE — TELEPHONE ENCOUNTER
Caller: Ranulfo Morales    Relationship: Self    Best call back number: [phone]    Requested Prescriptions:   Requested Prescriptions     Pending Prescriptions Disp Refills    apixaban (ELIQUIS) 5 MG tablet tablet       Sig: Take 1 tablet by mouth 2 (Two) Times a Day.        Pharmacy where request should be sent: [facility] [id number] [address] - [phone]  - [phone]     Last office visit with prescribing clinician: 3/21/2025   Last telemedicine visit with prescribing clinician: Visit date not found   Next office visit with prescribing clinician: 8/8/2025     Additional details provided by patient: PATIENT IS ALMOST OUT, 2 DAYS LEFT    Does the patient have less than a 3 day supply:  [x] Yes  [] No    Would you like a call back once the refill request has been completed: [] Yes [x] No    If the office needs to give you a call back, can they leave a voicemail: [] Yes [x] No    Serenity Martines, [facility]   03/21/25 15:58 EDT

## 2025-03-21 NOTE — ASSESSMENT & PLAN NOTE
59 y.o. male presents for follow up from  hospital with relevant PMH PE, aortic dissection, who presented to the ED on 3/17/2025 for evaluation of post surgical to evolve or acute dissection recent surgery.  With dissection revision on 3/13/2025.   Febrile at home feeling bad no pain. Weakness, shortness of breath     DVT/PE on Eliquis, HTN, HOCM, median sternotomy and hemiarch replacement with Dr. Patel 01/2025, Zone 3 TEVAR with Dr. Whitaker on 1/2/2025 for acute TAAD and recently status post endovascular repair of residual type B dissection, balloon angioplasty and stenting of left subclavian artery on 03/13 who presented to the [facility] on 3/17/2025 with fever. He was in ER off and on over weekend and negative for covid, flu and numerous other viruses. He was having low blood pressures until he started holding his blood pressures. He has been struggling with increased shortness of air with mild exertion. The scan appeared to have been prior ascending aortic repair. Redemonstration of an adjacent nonenhancing fluid collection which is similar in size to the prior examination measuring approximately 3.5 x 2.8 cm with peripheral rind. Unchanged wall thickening of the ascending aorta which may be related to postsurgical changes and/or intramural hematoma with unchanged approximately 14 mm outpouching from the superior right ascending aorta series 9 image 145. There has been interval placement of an intra-aortic stent which extends from the level of the prior ascending aortic repair to the level of the previously seen distal aortic arch stent. Stents remain patent. There is now noted to be increasing thrombus within the majority of the dissection within the aortic arch and the descending aorta with a small amount of residual contrast within the distal most ascending aorta adjacent to the distal portion of the stent which may be related to retrograde filling.    Vascular Surgery Discharge Summary             Hospitalization  Admit Date/Time: 3/13/2025  5:46 AM  Admitting Attending: Dale Whitaker  Discharge Date: 3/14/2025  Discharge Attending Physician: TIFFANY Goodson   PCP name and Address:  Kvng Lemos MD [address] / [facility] [address]  Referring provider name and address:   Birgit Can, APRN  [address]  [place] [address]     Chief Concern, Brief History of Present Illness, and Hospital Course  Ranulfo Morales is a 59 y.o. male who presented to [facility] on 3/13/2025 known residual type B dissection, Zone 3 CTAG.  Patient s/p median sternotomy and hemiarch replacement with Dr. Patel and Zone 3 TEVAR with Dr. Whitaker on 1/2/2025 for acute TAAD.  Patient was seen in office by Dr. Whitaker on 2/19.  A comprehensive discussion of nonoperative, best medical therapy, endovascular, open surgical and hybrid surgical options was had with the patient, along with risks.  Patient verbalized understanding and wished to proceed with surgical intervention.  Patient presented on the above mentioned day and proceeded to OR as planned with Dr. Whitaker for the following procedures:     Procedure(s) 3/13/2025 (Julianne):  Endovascular repair of thoracic aortic dissection with thoracic branch endoprosthesis ([place] TBE 37 mm x 15 cm; LSCA 15 mm branch)  Angiography and selective catheterization of the left subclavian artery  Percutaneous transluminal balloon angioplasty and stenting of the left subclavian artery  Closure of large bore > 12 F (22 [place] DrySeal)     The patient was successfully extubated post-operatively without reintubation. On day of discharge (POD #1), patient was evaluated by the  Vascular Surgery team.  Vital signs and laboratory values were stable and appropriate.  Patient was voiding independently, ambulating, tolerating regular diet, reporting adequate pain control, and flatus. Hospital course was overall uncomplicated other than episode of bigeminy/trigeminy  for which the patient was worked up.  Patient follows with Cardiology and reports being told in the past he had an irregular heartbeat. At the time of discharge, pt denies chest pain, palpitations, and SOB. He was walking around the unit just prior to evaluating him. His troponins were 12 to 1        I have gone over his hospitalization from  from 3/13 to 3/14/2025 and his medications and his follow up visits and adjusted his medications and follow up visits to add DR Maldonado and he voiced understanding. He will resume cardiology rehab next week and see Cardiology next week. His blood count was better on 3/17 and had low BNP regarding his breathing. He is highly complicated and already been back to ER but hopefully adjusting medicines and follow ups will reduce risk of more ER or hospitalizations.

## 2025-03-24 NOTE — TELEPHONE ENCOUNTER
Rx Refill Note  Requested Prescriptions     Pending Prescriptions Disp Refills    apixaban (ELIQUIS) 5 MG tablet tablet       Sig: Take 1 tablet by mouth 2 (Two) Times a Day.      Last office visit with prescribing clinician: 3/21/2025   Last telemedicine visit with prescribing clinician: Visit date not found   Next office visit with prescribing clinician: 8/8/2025                         Would you like a call back once the refill request has been completed: [] Yes [] No    If the office needs to give you a call back, can they leave a voicemail: [] Yes [] No    Cassandra Agarwal MA  03/24/25, 07:18 EDT

## 2025-03-24 NOTE — TELEPHONE ENCOUNTER
Rx Refill Note  Requested Prescriptions     Pending Prescriptions Disp Refills    apixaban (ELIQUIS) 5 MG tablet tablet       Sig: Take 1 tablet by mouth 2 (Two) Times a Day.      Last office visit with prescribing clinician: 3/21/2025   Last telemedicine visit with prescribing clinician: Visit date not found   Next office visit with prescribing clinician: 8/8/2025                         Would you like a call back once the refill request has been completed: [] Yes [] No    If the office needs to give you a call back, can they leave a voicemail: [] Yes [] No    Cassandra Agarwal MA  03/24/25, 07:18 EDTRx Refill Note  Requested Prescriptions     Pending Prescriptions Disp Refills    apixaban (ELIQUIS) 5 MG tablet tablet       Sig: Take 1 tablet by mouth 2 (Two) Times a Day.      Last office visit with prescribing clinician: 3/21/2025   Last telemedicine visit with prescribing clinician: Visit date not found   Next office visit with prescribing clinician: 8/8/2025                         Would you like a call back once the refill request has been completed: [] Yes [] No    If the office needs to give you a call back, can they leave a voicemail: [] Yes [] No    Cassandra Agarwal MA  03/24/25, 07:12 EDT

## 2025-03-31 ENCOUNTER — TELEPHONE (OUTPATIENT)
Dept: INTERNAL MEDICINE | Facility: CLINIC | Age: 60
End: 2025-03-31

## 2025-03-31 ENCOUNTER — OFFICE VISIT (OUTPATIENT)
Dept: INTERNAL MEDICINE | Facility: CLINIC | Age: 60
End: 2025-03-31
Payer: COMMERCIAL

## 2025-03-31 VITALS
HEIGHT: 72 IN | WEIGHT: 218 LBS | DIASTOLIC BLOOD PRESSURE: 80 MMHG | SYSTOLIC BLOOD PRESSURE: 128 MMHG | OXYGEN SATURATION: 98 % | HEART RATE: 76 BPM | BODY MASS INDEX: 29.53 KG/M2

## 2025-03-31 DIAGNOSIS — K59.09 OTHER CONSTIPATION: ICD-10-CM

## 2025-03-31 DIAGNOSIS — R10.9 FLANK PAIN: Primary | ICD-10-CM

## 2025-03-31 DIAGNOSIS — R10.9 LEFT FLANK PAIN: ICD-10-CM

## 2025-03-31 LAB
BILIRUB BLD-MCNC: ABNORMAL MG/DL
CLARITY, POC: CLEAR
COLOR UR: ABNORMAL
EXPIRATION DATE: ABNORMAL
GLUCOSE UR STRIP-MCNC: NEGATIVE MG/DL
KETONES UR QL: ABNORMAL
LEUKOCYTE EST, POC: NEGATIVE
Lab: ABNORMAL
NITRITE UR-MCNC: NEGATIVE MG/ML
PH UR: 6.5 [PH] (ref 5–8)
PROT UR STRIP-MCNC: ABNORMAL MG/DL
RBC # UR STRIP: NEGATIVE /UL
SP GR UR: 1.02 (ref 1–1.03)
UROBILINOGEN UR QL: NORMAL

## 2025-03-31 PROCEDURE — 81003 URINALYSIS AUTO W/O SCOPE: CPT | Performed by: INTERNAL MEDICINE

## 2025-03-31 PROCEDURE — 99214 OFFICE O/P EST MOD 30 MIN: CPT | Performed by: INTERNAL MEDICINE

## 2025-03-31 RX ORDER — METOPROLOL TARTRATE 100 MG/1
100 TABLET ORAL 2 TIMES DAILY
COMMUNITY
Start: 2025-01-15 | End: 2025-04-15

## 2025-03-31 RX ORDER — DOCUSATE SODIUM 100 MG/1
100 CAPSULE, LIQUID FILLED ORAL 2 TIMES DAILY
Qty: 60 CAPSULE | Refills: 5 | Status: SHIPPED | OUTPATIENT
Start: 2025-03-31

## 2025-03-31 NOTE — PATIENT INSTRUCTIONS
POC urinalysis shows no blood small bilirubin and no leukocytes  Continue all current medications  Suggest stool softener on arriving home today and 1 tonight and then 1 every a.m.  If pain increases suggest ER visit at [facility]  Encouraged follow-up with cardiology and vascular surgeons at   Encouraged careful ambulation and regular diet  Return visit as needed and with Dr. Lemos on August 8, 2025

## 2025-03-31 NOTE — ASSESSMENT & PLAN NOTE
POC urinalysis is negative for blood or infection  We will prescribe stool softener daily  If not improved or notes significant change with increasing pain the patient needs to be seen in the [facility] ER where his recent vascular surgery was performed

## 2025-03-31 NOTE — TELEPHONE ENCOUNTER
PT SAW DR TORO TODAY.  PT STATED HE SPOKE TO HIS WIFE AND HE WILL NEED A STOOL SOFTENER CALLED IN PLEASE.

## 2025-03-31 NOTE — ASSESSMENT & PLAN NOTE
POC analysis is negative  Believe the problem is mild constipation  We will prescribe stool softener daily  Discussed with patient and that if the pain increases he is to proceed immediately to the  ER

## 2025-03-31 NOTE — PROGRESS NOTES
[facility]     Ranulfo Morales  1965   [id number]      Patient Care Team:  Kvng Lemos MD as PCP - General (Internal Medicine)  Toni Maldonado MD as Consulting Physician (Cardiology)  Lupe Capone APRN as Nurse Practitioner (Cardiology)    Chief Complaint::   Chief Complaint   Patient presents with    Flank Pain     Left for about a week.  Denies dysuria            HPI  History of Present Illness  The patient is a 59-year-old male, a patient of Dr. Kvng Lemos, who presents with complaints of left flank pain for the past few days.    He reports experiencing mild bilateral kidney pain, which he initially mistook for lower back muscle discomfort. The pain has been present for approximately one week, with no significant difference in intensity between the two sides. He rates the pain as 3.5 to 4 on a scale of 10 upon waking, which decreased to 1.5 to 2 after consuming cranberry juice. He currently rates his pain as 1 on both sides, describing it as an achy sensation. He reports no burning or stinging during urination and urinates every 2 to 2.5 hours. He has not observed any blood in his urine, although he notes that his urine appeared darker during his hospital stay. He has no history of kidney stones. He has noticed a decrease in appetite since his surgery and has lost over 20 pounds since January 2025. He has been drinking a lot of water but not eating much. He has been experiencing constipation, with bowel movements consisting of small amounts of stool. He last had a bowel movement yesterday but it was not substantial. He has stool softeners at home that he has not been taking and plans to start using them today. He has not experienced any diarrhea or loose stools. He has been experiencing abdominal pain, which he describes as different from his back pain. He has not had a good bowel movement since his surgery. He has been experiencing shortness of breath and fever, which  led to two emergency room visits. He was given IV contrast during these visits and was not allowed to drink water for 6 hours. He was diagnosed with influenza A three weeks ago and was not treated for it.    He has a history of aortic dissection, for which he underwent surgery on 01/02/2025. He also had a secondary surgery three weeks ago to reinforce the mesh in his upper aorta. He contracted influenza at the hospital post-surgery, resulting in a fever of 103 degrees for several days. He has noticed a significant difference in blood pressure readings between his right and left arms since his recent surgery. He was taken off his blood pressure medication due to low readings (111/60 or 99 systolic). His current medications include Eliquis and another medication to improve blood flow around his heart wall. He has been experiencing fatigue and decreased exercise tolerance since his recent surgery.    Supplemental Information  He had a clot in his right leg that sent him back to the hospital for 3 days. He also had a clot next to his lung. The clot was in the calf and it hurt significantly, but when he lay down, the pain would go away.    FAMILY HISTORY  His father has kidney issues with elevated creatinine levels.    MEDICATIONS  Eliquis      Patient Active Problem List   Diagnosis    Arthropathy    Epicondylitis, lateral (tennis elbow)    Myalgia    Diverticulitis    Hernia, inguinal, right    Pain in back    Impaired fasting glucose    Hyperlipidemia LDL goal <100    Benign essential hypertension    GERD (gastroesophageal reflux disease)    Preventative health care    External hemorrhoid    Nonrheumatic mitral valve regurgitation    Acute deep vein thrombosis (DVT) of popliteal vein of right lower extremity    Dissection of ascending aorta    Dyspnea on exertion    Left flank pain    Other constipation        Past Medical History:   Diagnosis Date    Acute deep vein thrombosis (DVT) of popliteal vein of right lower  "extremity 03/07/2025 2025 eliquis at       COVID     Dissection of ascending aorta 03/07/2025 2025 procedure [facility]       Hyperlipidemia     Hypertension     Right inguinal hernia 2018    repaired    Right shoulder pain        Past Surgical History:   Procedure Laterality Date    ABDOMINAL HERNIA REPAIR  1969    HERNIA REPAIR Right 2018    Dr. Sanchez       Family History   Problem Relation Age of Onset    Hyperlipidemia Mother     Hypertension Mother     Diabetes Father        Social History     Socioeconomic History    Marital status:    Tobacco Use    Smoking status: Never    Smokeless tobacco: Never   Vaping Use    Vaping status: Never Used   Substance and Sexual Activity    Alcohol use: Yes     Comment: 8    Drug use: No    Sexual activity: Defer       Allergies   Allergen Reactions    Amoxicillin Unknown (See Comments)     Unknown reaction    Niacin Other (See Comments)     Feels hot       Review of Systems     HEENT: Denies headache or dizzy denies vision or hearing change  NECK: Denies pain or dysphagia  CHEST: Denies cough or wheeze or recent lung infections or shortness of breath  CARDIAC: Denies chest pain pressure palpitations  ABD: Denies nausea vomiting does complain of low back bilateral discomfort and reduced bowel movement frequency with small firm stool  : Denies dysuria frequency or hematuria  NEURO: Denies syncope concussion  PSYCH: Denies anxiety depression  EXTREM: Complains of right lower leg soreness with recent diagnosis of DVT  SKIN: Denies rash    Vital Signs  Vitals:    03/31/25 1449 03/31/25 1505   BP: 130/80 128/80   BP Location: Left arm Right arm   Patient Position: Sitting Sitting   Cuff Size: Adult Adult   Pulse:  76   SpO2:  98%   Weight: 98.9 kg (218 lb)    Height: 182.9 cm (72\")    PainSc: 2     PainLoc: Back      Body mass index is 29.57 kg/m².  BMI is >= 25 and <30. (Overweight) The following options were offered after discussion;: exercise counseling/recommendations " and nutrition counseling/recommendations     Advance Care Planning         Current Outpatient Medications:     apixaban (ELIQUIS) 5 MG tablet tablet, Take 1 tablet by mouth 2 (Two) Times a Day., Disp: 60 tablet, Rfl: 5    metoprolol tartrate (LOPRESSOR) 100 MG tablet, Take 1 tablet by mouth 2 (Two) Times a Day., Disp: , Rfl:     multivitamin with minerals tablet tablet, Take 1 tablet by mouth Daily., Disp: , Rfl:     docusate sodium (Colace) 100 MG capsule, Take 1 capsule by mouth 2 (Two) Times a Day., Disp: 60 capsule, Rfl: 5    valsartan (Diovan) 40 MG tablet, 1 tab daily if systolic remains > 165 . (Patient not taking: Reported on 3/31/2025), Disp: 30 tablet, Rfl: 2    Physical Exam     ACE III MINI        Physical Exam  Physical Exam  Eyes, nose, and throat appear normal.  Neck exam is normal.  Lungs sound clear.  Heart rhythm is regular. Heart rate is normal. Blood pressure was good in both arms, though unequal, being slightly lower on the left. Heart sounds are generally good with a slight irregularity that is sometimes detectable and sometimes not.  Abdomen exhibits good bowel sounds and is not exquisitely tender upon examination. There is mild soreness in both flanks.  No tenderness in the upper chest or back.    Vital Signs  Blood pressure reading is 136/70.  HEENT: No asymmetry no sinus tenderness  NECK: No mass.  Bruit or thyromegaly  CHEST: Clear breath sounds with O2 saturation on room air 98% bases have some reduced breath sounds without rales wheezing or rhonchi  CARDIAC: Regular rhythm with stable blood pressure and heart rate  ABD: Some minor discomfort in the left lower mid abdomen no bruit no guarding or rebound and no flank percussion discomfort or pain  : Deferred  NEURO: Intact  PSYCH: Normal  EXTREM: Right leg nontender with recent diagnosis of DVT of the right calf on Eliquis  Skin: No rash surgery scars are healing     Results Review:    Recent Results (from the past 4 weeks)   POCT  glucose meter    Collection Time: 03/13/25  6:10 AM    Specimen: Blood    Specimen type and source: Blood, Capillary blood specimen (specimen)   Result Value Ref Range    Glucose 129 (H) 74 - 99 mg/dL    Comment      POC-'S ID Jeannine Scott     Device [id number]     BG Specimen Type Capillary    CBC (NO DIFF)    Collection Time: 03/13/25  6:53 AM    Specimen: Blood, Venous Line   Result Value Ref Range    WBC 4.89 3.70 - 10.30 10*3/uL    RBC 4.78 4.60 - 6.10 10*6/uL    Hemoglobin 12.9 (L) 13.7 - 17.5 g/dL    Hematocrit 40.2 40.0 - 51.0 %    Platelets 258 155 - 369 10*3/uL    MCV 84 79 - 98 fL    MCH 27 26.0 - 32.0 pg    MCHC 32.1 30.7 - 35.5 g/dL    RDW 13.7 11.5 - 14.5 %    MPV 10.7 8.8 - 12.5 fL    nRBC 0 <=0.0 per 100 WBCs   Blood Gas, Arterial -    Collection Time: 03/13/25  8:07 AM    Specimen: Blood, Arterial Line    Pre-op diagnosis:Aortic dissection, abdominal (CMS/HCC) [I71.02]   Result Value Ref Range    pH, Arterial 7.35 7.35 - 7.45    pCO2, Arterial 49 (H) 32 - 45 mmHg    pO2 Arterial 152 (H) 83 - 108 mmHg    O2  (H) 94 - 98 %    Base Excess 0.6 -2.0 - 3.0 mmol/L    HCO3, Arterial 27 (H) 22 - 26 mmol/L    Hematocrit, Blood Gas 37.3 (L) 40.0 - 51.0 %    Sodium 142 136 - 145 mmol/L    Potassium 3.8 3.6 - 4.9 mmol/L    Chloride 106 97 - 107 mmol/L    Glucose 120 (H) 74 - 99 mg/dL    Ionized Calcium, Arterial 5.1 4.6 - 5.1 mg/dL    Lactate 1.6 0.5 - 1.6 mmol/L   CBC AND DIFFERENTIAL    Collection Time: 03/14/25 12:37 AM    Specimen: Blood, Venous Line   Result Value Ref Range    WBC 8.43 3.70 - 10.30 10*3/uL    RBC 4.36 (L) 4.60 - 6.10 10*6/uL    Hemoglobin 11.7 (L) 13.7 - 17.5 g/dL    Hematocrit 36.5 (L) 40.0 - 51.0 %    Platelets 221 155 - 369 10*3/uL    MCV 84 79 - 98 fL    MCH 26.8 26.0 - 32.0 pg    MCHC 32.1 30.7 - 35.5 g/dL    RDW 13.7 11.5 - 14.5 %    MPV 10.6 8.8 - 12.5 fL    nRBC 0 <=0.0 per 100 WBCs    Differential Type Automated     Neutrophil Rel % 76 %    Lymphocyte Rel %  13 %    Monocyte Rel % 11 %    Eosinophil % 0 %    Basophil Rel % 0 %    Immature Grans % 0 %    Neutrophils Absolute 6.41 (H) 1.60 - 6.10 10*3/uL    Lymphocytes Absolute 1.07 (L) 1.20 - 3.90 10*3/uL    Monocytes Absolute 0.91 (H) 0.30 - 0.90 10*3/uL    Eosinophils Absolute 0 0.00 - 0.50 10*3/uL    Basophils Absolute 0.01 0.00 - 0.10 10*3/uL    Immature Grans, Absolute 0.03 0.00 - 0.06 10*3/uL   MAGNESIUM    Collection Time: 03/14/25 12:37 AM    Specimen: Blood, Venous Line   Result Value Ref Range    Magnesium 1.7 (L) 1.9 - 2.4 mg/dL   PHOSPHORUS    Collection Time: 03/14/25 12:37 AM    Specimen: Blood, Venous Line   Result Value Ref Range    Phosphorus 2.9 2.5 - 4.5 mg/dL   MAGNESIUM    Collection Time: 03/14/25  2:15 PM    Specimen: Blood, Venous Line   Result Value Ref Range    Magnesium 2.3 1.9 - 2.4 mg/dL   PHOSPHORUS    Collection Time: 03/14/25  2:15 PM    Specimen: Blood, Venous Line   Result Value Ref Range    Phosphorus 2.4 (L) 2.5 - 4.5 mg/dL   POTASSIUM    Collection Time: 03/14/25  2:15 PM    Specimen: Blood, Venous Line   Result Value Ref Range    Potassium 4.1 3.6 - 4.9 mmol/L   TROPONIN    Collection Time: 03/14/25  2:15 PM    Specimen: Blood, Venous Line   Result Value Ref Range    Troponin T 12 <19 ng/L   TROPONIN T REFERENCE    Collection Time: 03/14/25  4:22 PM    Specimen: Blood, Venous Line   Result Value Ref Range    Troponin T 11 <19 ng/L   MAGNESIUM    Collection Time: 03/17/25  3:52 PM    Specimen: Blood, Venous Line   Result Value Ref Range    Magnesium 2.1 1.9 - 2.4 mg/dL   CBC AND DIFFERENTIAL    Collection Time: 03/17/25  3:52 PM    Specimen: Blood, Venous Line   Result Value Ref Range    WBC 5.48 3.70 - 10.30 10*3/uL    RBC 5.23 4.60 - 6.10 10*6/uL    Hemoglobin 14.2 13.7 - 17.5 g/dL    Hematocrit 42.9 40.0 - 51.0 %    Platelets 195 155 - 369 10*3/uL    MCV 82 79 - 98 fL    MCH 27.2 26.0 - 32.0 pg    MCHC 33.1 30.7 - 35.5 g/dL    RDW 13.7 11.5 - 14.5 %    MPV 11.1 8.8 - 12.5 fL     nRBC 0 <=0.0 per 100 WBCs    Differential Type Automated     Neutrophil Rel % 56 %    Lymphocyte Rel % 26 %    Monocyte Rel % 18 %    Eosinophil % 0 %    Basophil Rel % 0 %    Immature Grans % 0 %    Neutrophils Absolute 2.99 1.60 - 6.10 10*3/uL    Lymphocytes Absolute 1.44 1.20 - 3.90 10*3/uL    Monocytes Absolute 1.01 (H) 0.30 - 0.90 10*3/uL    Eosinophils Absolute 0.02 0.00 - 0.50 10*3/uL    Basophils Absolute 0.01 0.00 - 0.10 10*3/uL    Immature Grans, Absolute 0.01 0.00 - 0.06 10*3/uL   PROBNP (REFERENCE)    Collection Time: 03/17/25  3:52 PM    Specimen: Blood, Venous Line   Result Value Ref Range    proBNP 370 0 - 899 pg/mL   PROCALCITONIN    Collection Time: 03/17/25  3:52 PM    Specimen: Blood, Venous Line   Result Value Ref Range    Procalcitonin 0.21 (H) <0.09 ng/mL   Blood gas panel, venous    Collection Time: 03/17/25  4:14 PM    Specimen: Blood, Venous Line   Result Value Ref Range    pH, Venous 7.36 7.32 - 7.43    pCO2, Capillary 38 (L) 40 - 55 mmHg    pO2, Venous 35 25 - 40 mmHg    SO2 61 (L) 65 - 80 %    Base Excess -3.1 (L) -2.0 - 3.0 mmol/L    HCO3, Venous 22 22 - 26 mmol/L    Hematocrit, Blood Gas 41.8 40.0 - 51.0 %    Sodium 138 136 - 145 mmol/L    Potassium 4 3.6 - 4.9 mmol/L    Chloride 104 97 - 107 mmol/L    Glucose 111 (H) 74 - 99 mg/dL    Lactate, Arterial 1.7 0.5 - 2.2 mmol/L    Ionized Calcium, Arterial 4.7 4.6 - 5.1 mg/dL   PROTIME-INR    Collection Time: 03/17/25  4:14 PM    Specimen: Blood, Venous Line   Result Value Ref Range    Protime 15.5 (H) 12.0 - 14.3 sec    INR 1.2 (H) 0.9 - 1.1   POTASSIUM    Collection Time: 03/17/25  4:48 PM    Specimen: Blood, Venous Line   Result Value Ref Range    Potassium 4 3.6 - 4.9 mmol/L   C-REACTIVE PROTEIN    Collection Time: 03/17/25  4:48 PM    Specimen: Blood, Venous Line   Result Value Ref Range    C-Reactive Protein 198 (H) <=8.0 mg/L   HEPARIN ANTI XA    Collection Time: 03/17/25  4:48 PM    Specimen: Blood, Venous Line   Result Value Ref  Range    Heparin Anti Xa Unfractionated >1.10 (C) <1.00 IU/mL   TROPONIN    Collection Time: 03/17/25  4:48 PM    Specimen: Blood, Venous Line   Result Value Ref Range    Troponin T 13 <19 ng/L   Urinalysis With Microscopic If Indicated (No Culture) -    Collection Time: 03/17/25  4:59 PM    Specimen: Urine, Clean Catch   Result Value Ref Range    Color, UA Dark Yellow     Appearance, UA Clear     Specific Gravity, UA >1.030 (H) 1.005 - 1.030    pH, UA 5.5 5.0 - 8.0    Total Protein, urine 30 (A) Negative mg/dL    Glucose Negative Negative mg/dL    External Ketones, Urine 15 (A) Negative mg/dL    External Hemoglobin, Urine Negative Negative    Bilirubin, UA Small (A) Negative    Urobilinogen, UA 1.0 0.2 to 1.0 mg/dL    Leukocytes, UA Negative Negative    Nitrite, Quantitative, Urine Negative Negative   TROPONIN T REFERENCE    Collection Time: 03/17/25  6:53 PM    Specimen: Blood, Venous Line   Result Value Ref Range    Troponin T 11 <19 ng/L   COVID-19,[facility] IN-HOUSE [facility]PHE[facility] NP SWAB IN TRANSPORT MEDIA 1 HR TAT - ,    Collection Time: 03/18/25 12:21 AM    Specimen: Nasopharynx; Swab   Result Value Ref Range    SARS-CoV-2, EVER Not Detected Not Detected   Blood gas panel, venous    Collection Time: 03/23/25  3:16 PM    Specimen: Blood, Venous Line   Result Value Ref Range    pH, Venous 7.46 (H) 7.32 - 7.43    pCO2, Capillary 40 40 - 55 mmHg    pO2, Venous 26 25 - 40 mmHg    SO2 44 (L) 65 - 80 %    Base Excess 3.9 (H) -2.0 - 3.0 mmol/L    HCO3, Venous 28 (H) 22 - 26 mmol/L    Hematocrit, Blood Gas 36.3 (L) 40.0 - 51.0 %    Sodium 138 136 - 145 mmol/L    Potassium 4.1 3.6 - 4.9 mmol/L    Chloride 102 97 - 107 mmol/L    Glucose 129 (H) 74 - 99 mg/dL    Lactate, Arterial 1.6 0.5 - 2.2 mmol/L    Ionized Calcium, Arterial 4.7 4.6 - 5.1 mg/dL   MAGNESIUM    Collection Time: 03/23/25  3:16 PM    Specimen: Blood, Venous Line   Result Value Ref Range    Magnesium 1.9 1.9 - 2.4 mg/dL   CBC AND DIFFERENTIAL    Collection  Time: 03/23/25  3:16 PM    Specimen: Blood, Venous Line   Result Value Ref Range    WBC 5.22 3.70 - 10.30 10*3/uL    RBC 4.16 (L) 4.60 - 6.10 10*6/uL    Hemoglobin 11.1 (L) 13.7 - 17.5 g/dL    Hematocrit 33.8 (L) 40.0 - 51.0 %    Platelets 235 155 - 369 10*3/uL    MCV 81 79 - 98 fL    MCH 26.7 26.0 - 32.0 pg    MCHC 32.8 30.7 - 35.5 g/dL    RDW 13.9 11.5 - 14.5 %    MPV 10.8 8.8 - 12.5 fL    nRBC 0 <=0.0 per 100 WBCs    Differential Type Automated     Neutrophil Rel % 74 %    Lymphocyte Rel % 14 %    Monocyte Rel % 12 %    Eosinophil % 0 %    Basophil Rel % 0 %    Immature Grans % 0 %    Neutrophils Absolute 3.82 1.60 - 6.10 10*3/uL    Lymphocytes Absolute 0.72 (L) 1.20 - 3.90 10*3/uL    Monocytes Absolute 0.62 0.30 - 0.90 10*3/uL    Eosinophils Absolute 0.02 0.00 - 0.50 10*3/uL    Basophils Absolute 0.02 0.00 - 0.10 10*3/uL    Immature Grans, Absolute 0.02 0.00 - 0.06 10*3/uL   PROTIME-INR    Collection Time: 03/23/25  3:16 PM    Specimen: Blood, Venous Line   Result Value Ref Range    Protime 17.8 (H) 12.0 - 14.3 sec    INR 1.5 (H) 0.9 - 1.1   PROBNP (REFERENCE)    Collection Time: 03/23/25  3:16 PM    Specimen: Blood, Venous Line   Result Value Ref Range    proBNP 708 0 - 899 pg/mL   PROCALCITONIN    Collection Time: 03/23/25  3:16 PM    Specimen: Blood, Venous Line   Result Value Ref Range    Procalcitonin <0.06 <0.09 ng/mL   TROPONIN    Collection Time: 03/23/25  3:16 PM    Specimen: Blood, Venous Line   Result Value Ref Range    Troponin T 10 <19 ng/L   HEPARIN ANTI XA    Collection Time: 03/23/25  5:56 PM    Specimen: Blood, Venous Line   Result Value Ref Range    Heparin Anti Xa Unfractionated >1.10 (C) <1.00 IU/mL   TROPONIN T REFERENCE    Collection Time: 03/23/25  5:56 PM    Specimen: Blood, Venous Line   Result Value Ref Range    Troponin T 9 <19 ng/L   Urinalysis With Microscopic If Indicated (No Culture) -    Collection Time: 03/23/25  8:50 PM    Specimen: Urine, Clean Catch   Result Value Ref Range     Color, UA Dark Yellow     Appearance, UA Clear     Specific Gravity, UA >1.030 (H) 1.005 - 1.030    pH, UA 7.5 5.0 - 8.0    Total Protein, urine 30 (A) Negative mg/dL    Glucose Negative Negative mg/dL    External Ketones, Urine Trace (A) Negative mg/dL    External Hemoglobin, Urine Negative Negative    Bilirubin, UA Small (A) Negative    Urobilinogen, UA 1.0 0.2 to 1.0 mg/dL    Leukocytes, UA Trace (A) Negative    Nitrite, Quantitative, Urine Negative Negative    RBC, UA 4-10 (A) 0 to 3 /HPF    WBC, UA 0-5 0 to 5 /HPF    Squamous Epithelial Cells, UA 0-2 0 to 5 /HPF    Hyaline Casts, UA 0-2 0 to 5 /LPF    Bacteria, UA Negative Negative    Mucus, UA Present    POC Urinalysis Dipstick, Automated    Collection Time: 03/31/25  3:10 PM    Specimen: Urine   Result Value Ref Range    Color Dark Yellow Yellow, Straw, Dark Yellow, Mirna    Clarity, UA Clear Clear    Specific Gravity  1.020 1.005 - 1.030    pH, Urine 6.5 5.0 - 8.0    Leukocytes Negative Negative    Nitrite, UA Negative Negative    Protein,  mg/dL (A) Negative mg/dL    Glucose, UA Negative Negative mg/dL    Ketones, UA Trace (A) Negative    Urobilinogen, UA Normal Normal, 0.2 E.U./dL    Bilirubin Small (1+) (A) Negative    Blood, UA Negative Negative    Lot Number [id number]     Expiration Date 10,312,025        Procedures POC urinalysis negative for infection cells or blood    Medication Review: Medications reviewed and noted    Patient wellness counseling  Exercise: Encouraged walking and reduced ADL activity  Diet: Healthy cardiac diet  Screening: POC urinalysis negative for infection or blood  Social History     Socioeconomic History    Marital status:    Tobacco Use    Smoking status: Never    Smokeless tobacco: Never   Vaping Use    Vaping status: Never Used   Substance and Sexual Activity    Alcohol use: Yes     Comment: 8    Drug use: No    Sexual activity: Defer        Assessment/Plan:    Assessment & Plan  1. Bilateral flank pain.  The  patient's discomfort does not appear to be renal in origin, as evidenced by the absence of hematuria and the presence of mild anemia (CBC 11.1 g/dL). His white blood cell count is within normal limits, and his chemistry profile reveals a blood glucose level of 130 mg/dL, normal kidney function, and electrolyte balance. A minor abnormality was detected in one of his liver tests (62 IU/L), which could be a residual effect from his recent surgery. His abdominal CT scan from 03/23/2025 does not indicate any significant findings. The physical examination of his abdomen does not suggest any leakage or other complications. It is hypothesized that his discomfort may be due to constipation, as he reports infrequent bowel movements and reduced food intake post-surgery. He is advised to take a stool softener such as Colace daily, preferably one dose at bedtime and another in the morning. He is also encouraged to maintain his current diet and continue taking his multivitamin supplement. If his pain intensifies, he should seek immediate medical attention at the  emergency room.    2. Status post aortic dissection and secondary surgery.  The patient underwent a secondary surgery three weeks ago to add more mesh to the upper part of his aorta. He reports significant weight loss (over 20 pounds) since January 2025 and reduced physical activity due to postoperative recovery. He is currently on Eliquis and another medication to help with blood flow around the heart wall. He should continue his current medication regimen and follow up with his cardiologist as scheduled.    3. Anemia.  The patient has slight anemia with a hemoglobin level of 11.1 g/dL. This could be related to his recent surgeries and reduced food intake. He is advised to continue taking his multivitamin supplement, which likely contains some iron, to help manage his anemia.    4. Influenza A.  The patient tested positive for Influenza A three weeks ago and  experienced a high fever and significant discomfort. He reports that he was not treated for the flu at the time. No current treatment is necessary as the symptoms have resolved.    PROCEDURE  The patient underwent aortic dissection surgery on 01/02/2025, followed by a secondary surgery three weeks ago to reinforce the mesh in the upper part of the aorta.    Problem List Items Addressed This Visit          Gastrointestinal Abdominal     Left flank pain    Overview   Complains of flank pain originally complains of left flank pain but in addition has right flank pain at the same severity not in the mid back and no radiation of the flank pain left or right into the groin and no complaint of dysuria frequency or hematuria no history of kidney stone  With recent diagnosis of dissecting aorta with reconstruction and development of DVT of the right lower leg currently on Eliquis 5 mg twice daily  The patient complains of the discomfort of approximately 3-4 out of 10 and this day states it has been about 1-2/10 activity and movement do not seem to aggravate there is minor discomfort when lying supine he has no difficulty getting up from a chair to the exam table I see no rash, the patient has no tenderness to percussion of the left and right flank, the patient's range of motion of hips and knees are normal  The patient does complain of constipation within the last 10 days and has an occasional small firm BM he states his diet has not been good he denies nausea or vomiting or anterior abdominal pain  Exam reveals normal abdomen with some very minor discomfort in the lower quadrants particularly on the left  The patient had a POC urine analysis showing no blood or infection cells with minor bilirubin  Did not believe this is an acute abdomen related to his recent aorta surgery but believe related to mild constipation and fullness and have suggested stool softener when he gets home and prior to evening rest and then 1 daily  every morning         Current Assessment & Plan   POC analysis is negative  Believe the problem is mild constipation  We will prescribe stool softener daily  Discussed with patient and that if the pain increases he is to proceed immediately to the  ER         Other constipation    Overview   The patient has had very limited bowel movement in the last week with very small infrequent firm stool states he has not had a normal bowel movement in a week to 10 days  General Abdominal exam very mildly distended no guarding no rebound there is some very mild soreness of the lower left abdomen with positive bowel sounds there is no percussion pain to the flank left or right  Believe the problem is primarily constipation and not related to his recent major vascular surgery or the DVT of the right lower leg         Current Assessment & Plan   POC urinalysis is negative for blood or infection  We will prescribe stool softener daily  If not improved or notes significant change with increasing pain the patient needs to be seen in the  ER where his recent vascular surgery was performed          Other Visit Diagnoses         Flank pain    -  Primary    Relevant Orders    POC Urinalysis Dipstick, Automated (Completed)             Patient Instructions   POC urinalysis shows no blood small bilirubin and no leukocytes  Continue all current medications  Suggest stool softener on arriving home today and 1 tonight and then 1 every a.m.  If pain increases suggest ER visit at [facility]  Encouraged follow-up with cardiology and vascular surgeons at   Encouraged careful ambulation and regular diet  Return visit as needed and with Dr. Lemos on August 8, 2025     Plan of care reviewed with patient at the conclusion of today's visit. Education was provided regarding diagnosis, management, and any prescribed or recommended OTC medications.Patient verbalizes understanding of and agreement with management plan.       03/31/25   15:07  EDT    Patient or patient representative verbalized consent for the use of Ambient Listening during the visit with  Hector Pinedo MD for chart documentation. 3/31/2025  18:51 EDT

## 2025-04-02 ENCOUNTER — TELEPHONE (OUTPATIENT)
Dept: INTERNAL MEDICINE | Facility: CLINIC | Age: 60
End: 2025-04-02

## 2025-04-02 DIAGNOSIS — K59.09 OTHER CONSTIPATION: Primary | ICD-10-CM

## 2025-04-02 NOTE — TELEPHONE ENCOUNTER
Caller: Ranulfo Morales    Relationship: Self    Best call back number: [phone]    What medication are you requesting:     SOMETHING TO WORK IN THE NEXT FEW HOURS    What are your current symptoms:     CONSTIPATED    How long have you been experiencing symptoms:     DAYS    If a prescription is needed, what is your preferred pharmacy and phone number: [facility] [id number] - [place] [address] - [phone]  - [phone] FX     Additional notes:    PLEASE CALL PATIENT    OVER THE COUNTER NOT WORKING

## 2025-04-02 NOTE — TELEPHONE ENCOUNTER
When this bad There is no majic prescription typically GI recommends bottle magcitrate and fleets enema I will send in the magcitrate and can get the enema OTC and can use miralax daily and konsyl fiber prior to a meal and push water  going forward and if not successful  need to follow up again with us or GI for next daily regime pill

## 2025-05-02 ENCOUNTER — TELEPHONE (OUTPATIENT)
Dept: INTERNAL MEDICINE | Facility: CLINIC | Age: 60
End: 2025-05-02

## 2025-05-02 NOTE — TELEPHONE ENCOUNTER
Pharmacy Name: [facility] [id number] [facility] [address] - [phone]  - [phone]        Pharmacy representative phone number:[phone]     What medication are you calling in regards to: METOPROLOL TARTRATE 100 MG 1 DAILY    What question does the pharmacy have: WAS DISCONTINUED BY OFFICE BUT DR WEEMS IN CARDIOLOGY WANTS PATIENT TO STAY ON IT AND HE IS OUT OF REFILLS    Who is the provider that prescribed the medication: DR SHAH    Additional notes:

## 2025-05-05 RX ORDER — METOPROLOL TARTRATE 25 MG/1
25 TABLET, FILM COATED ORAL 2 TIMES DAILY
Qty: 60 TABLET | Refills: 5 | Status: SHIPPED | OUTPATIENT
Start: 2025-05-05

## 2025-05-05 NOTE — TELEPHONE ENCOUNTER
I can send in low dose of it but ideally cardiology would send in whatever dose they think appropriate

## 2025-07-07 DIAGNOSIS — I10 BENIGN ESSENTIAL HYPERTENSION: ICD-10-CM

## 2025-07-07 RX ORDER — VALSARTAN 40 MG/1
TABLET ORAL
Qty: 30 TABLET | Refills: 2 | Status: SHIPPED | OUTPATIENT
Start: 2025-07-07